# Patient Record
Sex: FEMALE | Race: WHITE | Employment: OTHER | URBAN - METROPOLITAN AREA
[De-identification: names, ages, dates, MRNs, and addresses within clinical notes are randomized per-mention and may not be internally consistent; named-entity substitution may affect disease eponyms.]

---

## 2017-01-25 ENCOUNTER — ANESTHESIA (OUTPATIENT)
Dept: ENDOSCOPY | Age: 72
End: 2017-01-25
Payer: MEDICARE

## 2017-01-25 ENCOUNTER — ANESTHESIA EVENT (OUTPATIENT)
Dept: ENDOSCOPY | Age: 72
End: 2017-01-25
Payer: MEDICARE

## 2017-01-25 PROCEDURE — 74011000250 HC RX REV CODE- 250

## 2017-01-25 PROCEDURE — 74011250636 HC RX REV CODE- 250/636

## 2017-01-25 RX ORDER — SODIUM CHLORIDE 9 MG/ML
INJECTION, SOLUTION INTRAVENOUS
Status: DISCONTINUED | OUTPATIENT
Start: 2017-01-25 | End: 2017-01-25 | Stop reason: HOSPADM

## 2017-01-25 RX ORDER — LIDOCAINE HYDROCHLORIDE 20 MG/ML
INJECTION, SOLUTION EPIDURAL; INFILTRATION; INTRACAUDAL; PERINEURAL AS NEEDED
Status: DISCONTINUED | OUTPATIENT
Start: 2017-01-25 | End: 2017-01-25 | Stop reason: HOSPADM

## 2017-01-25 RX ORDER — PROPOFOL 10 MG/ML
INJECTION, EMULSION INTRAVENOUS AS NEEDED
Status: DISCONTINUED | OUTPATIENT
Start: 2017-01-25 | End: 2017-01-25 | Stop reason: HOSPADM

## 2017-01-25 RX ADMIN — SODIUM CHLORIDE: 9 INJECTION, SOLUTION INTRAVENOUS at 13:10

## 2017-01-25 RX ADMIN — PROPOFOL 50 MG: 10 INJECTION, EMULSION INTRAVENOUS at 13:12

## 2017-01-25 RX ADMIN — PROPOFOL 50 MG: 10 INJECTION, EMULSION INTRAVENOUS at 13:16

## 2017-01-25 RX ADMIN — PROPOFOL 50 MG: 10 INJECTION, EMULSION INTRAVENOUS at 13:14

## 2017-01-25 RX ADMIN — LIDOCAINE HYDROCHLORIDE 40 MG: 20 INJECTION, SOLUTION EPIDURAL; INFILTRATION; INTRACAUDAL; PERINEURAL at 13:12

## 2017-01-25 RX ADMIN — PROPOFOL 50 MG: 10 INJECTION, EMULSION INTRAVENOUS at 13:21

## 2017-01-25 NOTE — ANESTHESIA PREPROCEDURE EVALUATION
Anesthetic History   No history of anesthetic complications            Review of Systems / Medical History  Patient summary reviewed, nursing notes reviewed and pertinent labs reviewed    Pulmonary  Within defined limits                 Neuro/Psych   Within defined limits           Cardiovascular  Within defined limits                     GI/Hepatic/Renal  Within defined limits              Endo/Other        Cancer     Other Findings            Physical Exam    Airway  Mallampati: II  TM Distance: > 6 cm  Neck ROM: normal range of motion   Mouth opening: Normal     Cardiovascular  Regular rate and rhythm,  S1 and S2 normal,  no murmur, click, rub, or gallop             Dental    Dentition: Lower dentition intact and Upper dentition intact     Pulmonary  Breath sounds clear to auscultation               Abdominal  GI exam deferred       Other Findings            Anesthetic Plan    ASA: 2  Anesthesia type: MAC            Anesthetic plan and risks discussed with: Patient

## 2017-01-25 NOTE — ANESTHESIA POSTPROCEDURE EVALUATION
Post-Anesthesia Evaluation and Assessment    Patient: Zuhair Gamino MRN: [id number]  SSN: [id number]    YOB: 1945  Age: 66 y.o. Sex: female       Cardiovascular Function/Vital Signs  Visit Vitals    /66    Pulse 75    Temp 36.4 °C (97.6 °F)    Resp 22    Ht 5' 3\" (1.6 m)    Wt 49.9 kg (110 lb)    SpO2 100%    BMI 19.49 kg/m2       Patient is status post MAC anesthesia for Procedure(s):  COLONOSCOPY. Nausea/Vomiting: None    Postoperative hydration reviewed and adequate. Pain:  Pain Scale 1: Numeric (0 - 10) (01/25/17 1401)  Pain Intensity 1: 0 (01/25/17 1401)   Managed    Neurological Status: At baseline    Mental Status and Level of Consciousness: Arousable    Pulmonary Status:   O2 Device: Room air (01/25/17 1401)   Adequate oxygenation and airway patent    Complications related to anesthesia: None    Post-anesthesia assessment completed.  No concerns    Signed By: David Torrez MD     January 25, 2017

## 2018-07-16 ENCOUNTER — OFFICE VISIT (OUTPATIENT)
Dept: FAMILY MEDICINE CLINIC | Age: 73
End: 2018-07-16

## 2018-07-16 VITALS
OXYGEN SATURATION: 97 % | HEIGHT: 63 IN | BODY MASS INDEX: 18.77 KG/M2 | HEART RATE: 86 BPM | RESPIRATION RATE: 16 BRPM | TEMPERATURE: 97.6 F | WEIGHT: 105.9 LBS | SYSTOLIC BLOOD PRESSURE: 118 MMHG | DIASTOLIC BLOOD PRESSURE: 77 MMHG

## 2018-07-16 DIAGNOSIS — M85.80 OSTEOPENIA, UNSPECIFIED LOCATION: ICD-10-CM

## 2018-07-16 DIAGNOSIS — M81.0 POSTMENOPAUSAL BONE LOSS: ICD-10-CM

## 2018-07-16 DIAGNOSIS — M54.2 NECK PAIN: ICD-10-CM

## 2018-07-16 DIAGNOSIS — Z13.820 OSTEOPOROSIS SCREENING: ICD-10-CM

## 2018-07-16 DIAGNOSIS — Z12.31 BREAST CANCER SCREENING BY MAMMOGRAM: ICD-10-CM

## 2018-07-16 DIAGNOSIS — E78.00 PURE HYPERCHOLESTEROLEMIA: ICD-10-CM

## 2018-07-16 DIAGNOSIS — R05.3 COUGH, PERSISTENT: Primary | ICD-10-CM

## 2018-07-16 RX ORDER — LANOLIN ALCOHOL/MO/W.PET/CERES
1000 CREAM (GRAM) TOPICAL DAILY
COMMUNITY

## 2018-07-16 RX ORDER — FLUTICASONE PROPIONATE 110 UG/1
1-2 AEROSOL, METERED RESPIRATORY (INHALATION) EVERY 12 HOURS
Qty: 1 INHALER | Refills: 0 | Status: SHIPPED | OUTPATIENT
Start: 2018-07-16 | End: 2020-02-20

## 2018-07-16 RX ORDER — CLINDAMYCIN PHOSPHATE 10 UG/ML
LOTION TOPICAL
Refills: 0 | COMMUNITY
Start: 2018-06-05

## 2018-07-16 RX ORDER — BENZONATATE 100 MG/1
CAPSULE ORAL AS NEEDED
COMMUNITY
Start: 2018-05-03 | End: 2018-07-16 | Stop reason: ALTCHOICE

## 2018-07-16 RX ORDER — PYRIDOXINE HCL (VITAMIN B6) 100 MG
100 TABLET ORAL DAILY
COMMUNITY

## 2018-07-16 NOTE — PROGRESS NOTES
Seeing derm for hair loss. Put on spironolactone for > yr. Did blood work 2 months ago. States cough began year ago May. Another virus this May. Cough has persisted. No color to mucus. Colonoscopy 1-2 yrs ago. Rec 3 yr repeat for h/o colon cancer. Had some neck pain which is resolving. Visit Vitals    /77    Pulse 86    Temp 97.6 °F (36.4 °C) (Oral)    Resp 16    Ht 5' 3\" (1.6 m)    Wt 105 lb 14.4 oz (48 kg)    SpO2 97%    BMI 18.76 kg/m2       Patient alert and cooperative. Reviewed above. Assessment:  1. Persistent cough post remote viral illness. Plan:  1. Rx for Flovent to try. 2. Follow up in two to three weeks if cough persists for possible pulmonary versus allergist referral.  3. Get records form derm, including labs. 4. Order fasting lipids. 5. Patient to return for annual wellness. 6. Follow otherwise here prn.

## 2018-07-16 NOTE — PROGRESS NOTES
Identified pt with two pt identifiers(name and ). Reviewed record in preparation for visit and have obtained necessary documentation. Chief Complaint   Patient presents with    Establish Care    Cough     pt c/o lingering cough for >1 yr; reports having a virus 2018, which cough has improved, but not yet resolved (was given prednisone- only took 1 dose due to causing insomnia), dx with bronchitis        Health Maintenance Due   Topic    ZOSTER VACCINE AGE 60>     GLAUCOMA SCREENING Q2Y     DTaP/Tdap/Td series (1 - Tdap)    BREAST CANCER SCRN MAMMOGRAM     MEDICARE YEARLY EXAM    -pt states not UTD with mammo, unsure if UTD with glaucoma (usually done with Dr. Romelia Mi), no shingles vaccine (did have chickenpox)    Coordination of Care Questionnaire:  :   1) Have you been to an emergency room, urgent care clinic since your last visit? yes - [facility] for cough on 5/3/18  Hospitalized since your last visit? no             2. Have seen or consulted any other health care provider since your last visit? YES  If yes, where when, and reason for visit? Dr. Riky Kasper (derm)    3) Do you have an Advanced Directive/ Living Will in place? YES  If yes, do we have a copy on file YES  If no, would you like information NO    Patient is accompanied by self I have received verbal consent from Milo Richter to discuss any/all medical information while they are present in the room.

## 2018-07-16 NOTE — MR AVS SNAPSHOT
[address] [place] [address] 
[address] [place] [address] 
[phone] Patient: Elie Chi MRN: [id number] GVX:1/8/7362 Visit Information Date & Time Provider Department Dept. Phone Encounter #  
 7/16/2018  3:00 PM Tonya Ramos MD [facility] [phone] [id number] Follow-up Instructions Return in about 4 weeks (around 8/13/2018) for [address] Routing History Upcoming Health Maintenance Date Due ZOSTER VACCINE AGE 60> 3/7/2004 GLAUCOMA SCREENING Q2Y 5/7/2009 DTaP/Tdap/Td series (1 - Tdap) 8/5/2011 MEDICARE YEARLY EXAM 3/14/2018 BREAST CANCER SCRN MAMMOGRAM 8/16/2018* Influenza Age 5 to Adult 8/1/2018 COLONOSCOPY 1/25/2022 *Topic was postponed. The date shown is not the original due date. Allergies as of 7/16/2018  Review Complete On: 7/16/2018 By: Ana Segura LPN No Known Allergies Current Immunizations  Reviewed on 4/8/2014 Name Date Pneumococcal Vaccine (Unspecified Type) 11/25/2013 TD Vaccine 8/4/2011 ZZZ-RETIRED (DO NOT USE) Pneumococcal Vaccine (Unspecified Type) 8/4/2011 Not reviewed this visit You Were Diagnosed With   
  
 Codes Comments Cough, persistent    -  Primary ICD-10-CM: R05 ICD-9-CM: 786.2 Neck pain     ICD-10-CM: M54.2 ICD-9-CM: 723.1 Breast cancer screening by mammogram     ICD-10-CM: Z12.31 
ICD-9-CM: V76.12 Postmenopausal bone loss     ICD-10-CM: M81.0 ICD-9-CM: 733.01 Osteoporosis screening     ICD-10-CM: Z13.820 ICD-9-CM: V82.81 Osteopenia, unspecified location     ICD-10-CM: M85.80 ICD-9-CM: 733.90 Pure hypercholesterolemia     ICD-10-CM: E78.00 ICD-9-CM: 272.0 Vitals BP Pulse Temp Resp Height(growth percentile) Weight(growth percentile) 118/77 86 97.6 °F (36.4 °C) (Oral) 16 5' 3\" (1.6 m) 105 lb 14.4 oz (48 kg) SpO2 BMI OB Status Smoking Status 97% 18.76 kg/m2 Postmenopausal Never Smoker Vitals History BMI and BSA Data Body Mass Index Body Surface Area 18.76 kg/m 2 1.46 m 2 Preferred Pharmacy Pharmacy Name Phone [id number] [facility] [phone] Your Updated Medication List  
  
   
This list is accurate as of 7/16/18  3:38 PM.  Always use your most recent med list.  
  
  
  
  
 clindamycin 1 % lotion Commonly known as:  CLEOCIN T  
APPLY TO AFFECTED AREA EVERY DAY  
  
 CO Q-10 100 mg capsule Generic drug:  co-enzyme Q-10 Take 100 mg by mouth daily. fluticasone 110 mcg/actuation inhaler Commonly known as:  FLOVENT HFA Take 1-2 Puffs by inhalation every twelve (12) hours. GAS-X PO Take  by mouth as needed. magnesium 250 mg Tab Take 500 mg by mouth as needed. METAMUCIL PO Take 1 Cap by mouth daily. * OTHER Takes turmeric po once daily * OTHER Take 1 Tab by mouth daily. Takes one stool softener po twice daily. Indications: constipation, Dulcolax  
  
 spironolactone 50 mg tablet Commonly known as:  ALDACTONE Take 50 mg by mouth two (2) times a day. VITAMIN B-12 1,000 mcg tablet Generic drug:  cyanocobalamin Take 1,000 mcg by mouth daily. VITAMIN B-6 100 mg tablet Generic drug:  pyridoxine (vitamin B6) Take 100 mg by mouth daily. * Notice: This list has 2 medication(s) that are the same as other medications prescribed for you. Read the directions carefully, and ask your doctor or other care provider to review them with you. Prescriptions Sent to Pharmacy Refills  
 fluticasone (FLOVENT HFA) 110 mcg/actuation inhaler 0 Sig: Take 1-2 Puffs by inhalation every twelve (12) hours. Class: Normal  
 Pharmacy: [address] Ph #: [phone] Route: Inhalation We Performed the Following LIPID PANEL [08985 CPT(R)] Follow-up Instructions Return in about 4 weeks (around 8/13/2018) for [address] To-Do List   
 07/16/2018 Imaging:  DEXA BONE DENSITY STUDY AXIAL   
  
 07/16/2018 Imaging:  [facility] MAMMO BI SCREENING INCL CAD Introducing [facility]! Dear Sergey Banda: Thank you for requesting a [employer] account. Our records indicate that you already have an active [employer] account. You can access your account anytime at [email or web address] Did you know that you can access your hospital and ER discharge instructions at any time in [employer]? You can also review all of your test results from your hospital stay or ER visit. Additional Information If you have questions, please visit the Frequently Asked Questions section of the [employer] website at [email or web address] Remember, [employer] is NOT to be used for urgent needs. For medical emergencies, dial 911. Now available from your iPhone and Android! Please provide this summary of care documentation to your next provider. Your primary care clinician is listed as [phone] If you have any questions after today's visit, please call [phone].

## 2018-08-13 ENCOUNTER — HOSPITAL ENCOUNTER (OUTPATIENT)
Dept: MAMMOGRAPHY | Age: 73
Discharge: HOME | End: 2018-08-13
Attending: FAMILY MEDICINE
Payer: MEDICARE

## 2018-08-13 DIAGNOSIS — Z13.820 OSTEOPOROSIS SCREENING: ICD-10-CM

## 2018-08-13 DIAGNOSIS — M85.80 OSTEOPENIA, UNSPECIFIED LOCATION: ICD-10-CM

## 2018-08-13 DIAGNOSIS — M81.0 POSTMENOPAUSAL BONE LOSS: ICD-10-CM

## 2018-08-13 PROCEDURE — 77080 DXA BONE DENSITY AXIAL: CPT

## 2018-08-14 ENCOUNTER — TELEPHONE (OUTPATIENT)
Dept: FAMILY MEDICINE CLINIC | Age: 73
End: 2018-08-14

## 2018-08-14 NOTE — PROGRESS NOTES
Verified two paint identifiers, name and . Patient provided with lab results. Patient stated she will call back for Dr Kandace Kirby if she wants to start the medication.

## 2018-08-14 NOTE — TELEPHONE ENCOUNTER
----- Message from Guerrero Carpenter MD sent at 8/13/2018  3:09 PM EDT -----  Shows osteoporosis. Tx recommended. Advise if wants to start meds.

## 2020-02-20 ENCOUNTER — OFFICE VISIT (OUTPATIENT)
Dept: FAMILY MEDICINE CLINIC | Age: 75
End: 2020-02-20

## 2020-02-20 VITALS
OXYGEN SATURATION: 98 % | SYSTOLIC BLOOD PRESSURE: 118 MMHG | HEART RATE: 79 BPM | RESPIRATION RATE: 14 BRPM | WEIGHT: 107 LBS | TEMPERATURE: 97.2 F | BODY MASS INDEX: 18.96 KG/M2 | DIASTOLIC BLOOD PRESSURE: 70 MMHG | HEIGHT: 63 IN

## 2020-02-20 DIAGNOSIS — M81.0 AGE-RELATED OSTEOPOROSIS WITHOUT CURRENT PATHOLOGICAL FRACTURE: ICD-10-CM

## 2020-02-20 DIAGNOSIS — Z00.00 MEDICARE ANNUAL WELLNESS VISIT, INITIAL: ICD-10-CM

## 2020-02-20 DIAGNOSIS — Z71.89 ADVANCED DIRECTIVES, COUNSELING/DISCUSSION: ICD-10-CM

## 2020-02-20 DIAGNOSIS — E78.5 HYPERLIPIDEMIA, UNSPECIFIED HYPERLIPIDEMIA TYPE: Primary | ICD-10-CM

## 2020-02-20 DIAGNOSIS — Z85.038 HISTORY OF COLON CANCER, STAGE III: ICD-10-CM

## 2020-02-20 NOTE — PROGRESS NOTES
Chief Complaint   Patient presents with   [address] [place] [address] Annual Wellness Visit     1. Have you been to the ER, urgent care clinic since your last visit? Hospitalized since your last visit? No    2. Have you seen or consulted any other health care providers outside of the [address] [place] [address] since your last visit? Include any pap smears or colon screening.   No  Dr. Sadi Hartmann Dermatologist    Health Maintenance Due   Topic Date Due    DTaP/Tdap/Td series (1 - Tdap) 05/07/1955    Shingrix Vaccine Age 50> (1 of 2) 05/07/1994    GLAUCOMA SCREENING Q2Y  05/07/2009    Medicare Yearly Exam  03/14/2018    Lipid Screen  04/08/2019

## 2020-02-20 NOTE — ACP (ADVANCE CARE PLANNING)
Advance Care Planning    Advance Care Planning (ACP) Provider Conversation Snapshot    Date of ACP Conversation: 02/20/20  Persons included in Conversation:  patient  Length of ACP Conversation in minutes:  <16 minutes (Non-Billable)    Authorized Decision Maker (if patient is incapable of making informed decisions): This person is:    Other Legally Authorized Decision Maker (e.g. Next of Kin)  Odin          For Patients with Decision Making Capacity:   Values/Goals: Exploration of values, goals, and preferences if recovery is not expected, even with continued medical treatment in the event of:  Imminent death  Severe, permanent brain injury    Conversation Outcomes / Follow-Up Plan:   Reviewed existing Advance Directive

## 2020-02-20 NOTE — PATIENT INSTRUCTIONS
Medicare Wellness Visit, Female The best way to live healthy is to have a lifestyle where you eat a well-balanced diet, exercise regularly, limit alcohol use, and quit all forms of tobacco/nicotine, if applicable. Regular preventive services are another way to keep healthy. Preventive services (vaccines, screening tests, monitoring & exams) can help personalize your care plan, which helps you manage your own care. Screening tests can find health problems at the earliest stages, when they are easiest to treat. [facility] follows the current, evidence-based guidelines published by the [facility] Lowry ([facility]) when recommending preventive services for our patients. Because we follow these guidelines, sometimes recommendations change over time as research supports it. (For example, mammograms used to be recommended annually. Even though Medicare will still pay for an annual mammogram, the newer guidelines recommend a mammogram every two years for women of average risk). Of course, you and your doctor may decide to screen more often for some diseases, based on your risk and your co-morbidities (chronic disease you are already diagnosed with). Preventive services for you include: - Medicare offers their members a free annual wellness visit, which is time for you and your primary care provider to discuss and plan for your preventive service needs. Take advantage of this benefit every year! 
-All adults over the age of 72 should receive the recommended pneumonia vaccines. Current USPSTF guidelines recommend a series of two vaccines for the best pneumonia protection.  
-All adults should have a flu vaccine yearly and a tetanus vaccine every 10 years.  
-All adults age 48 and older should receive the shingles vaccines (series of two vaccines). -All adults age 38-68 who are overweight should have a diabetes screening test once every three years. -All adults born between [birth year] and 1965 should be screened once for Hepatitis C. 
-Other screening tests and preventive services for persons with diabetes include: an eye exam to screen for diabetic retinopathy, a kidney function test, a foot exam, and stricter control over your cholesterol.  
-Cardiovascular screening for adults with routine risk involves an electrocardiogram (ECG) at intervals determined by your doctor.  
-Colorectal cancer screenings should be done for adults age 54-65 with no increased risk factors for colorectal cancer. There are a number of acceptable methods of screening for this type of cancer. Each test has its own benefits and drawbacks. Discuss with your doctor what is most appropriate for you during your annual wellness visit. The different tests include: colonoscopy (considered the best screening method), a fecal occult blood test, a fecal DNA test, and sigmoidoscopy. 
 
-A bone mass density test is recommended when a woman turns 65 to screen for osteoporosis. This test is only recommended one time, as a screening. Some providers will use this same test as a disease monitoring tool if you already have osteoporosis. -Breast cancer screenings are recommended every other year for women of normal risk, age 54-69. 
-Cervical cancer screenings for women over age 72 are only recommended with certain risk factors. Here is a list of your current Health Maintenance items (your personalized list of preventive services) with a due date: 
Health Maintenance Due Topic Date Due  
 DTaP/Tdap/Td  (1 - Tdap) 05/07/1955  Glaucoma Screening   05/07/2009 [address] [place] [address] Annual Well Visit  03/14/2018  Cholesterol Test   04/08/2019

## 2020-02-20 NOTE — PROGRESS NOTES
This is an Initial Medicare Annual Wellness Exam (AWV) (Performed 12 months after IPPE or effective date of Medicare Part B enrollment, Once in a lifetime)    I have reviewed the patient's medical history in detail and updated the computerized patient record. Eye doctor past yr. Needs cataract surgery. Dentist 2 x annually. Colonoscopy '14.  5 yr repeat for h/o colon cancer. Mammo 5-10 yrs. Derm 2 x annually. No signif hx past yr. Had DEXA 2-3 yrs. History     Patient Active Problem List   Diagnosis Code    Pure hypercholesterolemia E78.00    Insomnia G47.00    History of colon cancer, stage III Z85.038    Allergic rhinitis J30.9    Age-related osteoporosis without current pathological fracture M81.0    Scalp cyst L72.9     Past Medical History:   Diagnosis Date    Acute pharyngitis 05/11/2017    [facility] PF note    Cancer ([facility] [address] 2001    colon dr Michele Cullen Hypercholesterolemia     Ill-defined condition     frozen shoulder - PT    Insect bite 09/09/2016    PF Tania doxy 100x 10 day  132/82    Insomnia     Davis's neuroma of left foot     7[facility]13/15 [facility] Pod notes    Osteopenia     Trochanteric bursitis     10/06/14 note from Rheumatology Dr Maria R Fortune      Past Surgical History:   Procedure Laterality Date    COLONOSCOPY N/A 1/25/2017    COLONOSCOPY performed by Tank Thomas [facility] at [facility] 149 W/O CONT WITH CALCIUM  10/2011    perfect zero score    ENDOSCOPY, COLON, DIAGNOSTIC  2001    colon cancer Dr Matt Sims HX COLONOSCOPY  4/8/09    3 yr repeat per report  Alyssa Evangelista    HX TUBAL LIGATION      HX VASCULAR ACCESS      port in and out     Current Outpatient Medications   Medication Sig Dispense Refill    pyridoxine, vitamin B6, (VITAMIN B-6) 100 mg tablet Take 100 mg by mouth daily.  cyanocobalamin (VITAMIN B-12) 1,000 mcg tablet Take 1,000 mcg by mouth daily.       clindamycin (CLEOCIN T) 1 % lotion APPLY TO AFFECTED AREA EVERY DAY  0    spironolactone (ALDACTONE) 50 mg tablet Take 50 mg by mouth two (2) times a day.  OTHER Takes turmeric po once daily      co-enzyme Q-10 (CO Q-10) 100 mg capsule Take 100 mg by mouth daily.  OTHER Take 1 Tab by mouth daily. Takes one stool softener po twice daily. Indications: constipation, Dulcolax      SIMETHICONE (GAS-X PO) Take  by mouth as needed.  magnesium 250 mg tab Take 500 mg by mouth as needed.  fluticasone (FLOVENT HFA) 110 mcg/actuation inhaler Take 1-2 Puffs by inhalation every twelve (12) hours. 1 Inhaler 0    PSYLLIUM SEED, WITH SUGAR, (METAMUCIL PO) Take 1 Cap by mouth daily. No Known Allergies    Family History   Problem Relation Age of Onset   Barrios Arthritis-rheumatoid Mother     COPD Mother     Hypertension Father     Stroke Father     Cancer Maternal Grandmother         colon    Arthritis-rheumatoid Maternal Grandmother     Stroke Paternal Grandfather     Other Son         colon polyps    Cancer Other         cousin with colon cancer     Social History     Tobacco Use    Smoking status: Never Smoker    Smokeless tobacco: Never Used   Substance Use Topics    Alcohol use: No       Depression Risk Factor Screening:     3 most recent PHQ Screens 2/20/2020   Little interest or pleasure in doing things -   Feeling down, depressed, irritable, or hopeless Not at all   Total Score PHQ 2 -       Alcohol Risk Factor Screening:   Do you average 1 drink per night or more than 7 drinks a week:  No    On any one occasion in the past three months have you have had more than 3 drinks containing alcohol:  No      Functional Ability and Level of Safety:   Hearing: The patient needs further evaluation. Activities of Daily Living: The home contains: handrails and rugs  Patient does total self care     Ambulation: with no difficulty    Fall Risk:  Fall Risk Assessment, last 12 mths 2/20/2020   Able to walk? Yes   Fall in past 12 months?  No Abuse Screen:  Patient is not abused    Cognitive Screening   Has your family/caregiver stated any concerns about your memory: no  Cognitive Screening: ND    Patient Care Team   Patient Care Team:  Aelx Forbes MD as PCP - Fabricio Mccrary MD as PCP - [facility] Empaneled Provider  Hudson Saldana MD (Obstetrics & Gynecology)  Suleman Styles MD (Gastroenterology)    Assessment/Plan   Education and counseling provided:  Are appropriate based on today's review and evaluation  End-of-Life planning (with patient's consent)  Pneumococcal Vaccine  Influenza Vaccine  Screening Mammography  Colorectal cancer screening tests  Cardiovascular screening blood test  Bone mass measurement (DEXA)  Screening for glaucoma  Diabetes screening test    Diagnoses and all orders for this visit:    1. Medicare annual wellness visit, initial    2. Hyperlipidemia, unspecified hyperlipidemia type  -     CT HEART W/O CONT WITH CALCIUM; Future    3. History of colon cancer, stage III    4.  Age-related osteoporosis without current pathological fracture         Health Maintenance Due   Topic Date Due    DTaP/Tdap/Td series (1 - Tdap) 05/07/1955    GLAUCOMA SCREENING Q2Y  05/07/2009    Medicare Yearly Exam  03/14/2018    Lipid Screen  04/08/2019

## 2020-03-12 ENCOUNTER — HOSPITAL ENCOUNTER (OUTPATIENT)
Dept: CT IMAGING | Age: 75
Discharge: HOME | End: 2020-03-12
Attending: FAMILY MEDICINE
Payer: SELF-PAY

## 2020-03-12 DIAGNOSIS — E78.5 HYPERLIPIDEMIA, UNSPECIFIED HYPERLIPIDEMIA TYPE: ICD-10-CM

## 2020-03-12 PROCEDURE — 75571 CT HRT W/O DYE W/CA TEST: CPT

## 2020-03-27 NOTE — PROGRESS NOTES
Pt called, ID verified. Pt advised CT Heart w/o cont with calcium showed minimal plaque in right coronary. Rec meds if LDL > 160. Pt verbalized understanding.

## 2021-01-28 ENCOUNTER — TRANSCRIBE ORDER (OUTPATIENT)
Dept: SCHEDULING | Age: 76
End: 2021-01-28

## 2021-01-28 DIAGNOSIS — Z85.038 HX OF MALIGNANT NEOPLASM OF COLON: ICD-10-CM

## 2021-01-28 DIAGNOSIS — M79.622 LEFT AXILLARY PAIN: ICD-10-CM

## 2021-01-28 DIAGNOSIS — N64.4 BREAST PAIN, LEFT: Primary | ICD-10-CM

## 2021-02-16 ENCOUNTER — HOSPITAL ENCOUNTER (OUTPATIENT)
Dept: ULTRASOUND IMAGING | Age: 76
Discharge: HOME | End: 2021-02-16
Attending: NURSE PRACTITIONER
Payer: MEDICARE

## 2021-02-16 ENCOUNTER — HOSPITAL ENCOUNTER (OUTPATIENT)
Dept: MAMMOGRAPHY | Age: 76
Discharge: HOME | End: 2021-02-16
Attending: NURSE PRACTITIONER
Payer: MEDICARE

## 2021-02-16 DIAGNOSIS — M79.622 LEFT AXILLARY PAIN: ICD-10-CM

## 2021-02-16 DIAGNOSIS — Z85.038 HX OF MALIGNANT NEOPLASM OF COLON: ICD-10-CM

## 2021-02-16 DIAGNOSIS — N64.4 BREAST PAIN, LEFT: ICD-10-CM

## 2021-02-16 PROCEDURE — 77067 SCR MAMMO BI INCL CAD: CPT

## 2021-03-29 ENCOUNTER — TRANSCRIBE ORDER (OUTPATIENT)
Dept: SCHEDULING | Age: 76
End: 2021-03-29

## 2021-03-29 DIAGNOSIS — R22.30 MASS IN ARMPIT: Primary | ICD-10-CM

## 2021-04-15 ENCOUNTER — HOSPITAL ENCOUNTER (OUTPATIENT)
Dept: ULTRASOUND IMAGING | Age: 76
Discharge: HOME | End: 2021-04-15
Attending: FAMILY MEDICINE
Payer: MEDICARE

## 2021-04-15 DIAGNOSIS — R22.30 MASS IN ARMPIT: ICD-10-CM

## 2021-04-15 PROCEDURE — 76882 US LMTD JT/FCL EVL NVASC XTR: CPT

## 2023-05-10 RX ORDER — SPIRONOLACTONE 50 MG/1
TABLET, FILM COATED ORAL 2 TIMES DAILY
COMMUNITY

## 2023-05-10 RX ORDER — UBIDECARENONE 100 MG
100 CAPSULE ORAL DAILY
COMMUNITY

## 2023-05-10 RX ORDER — CLINDAMYCIN PHOSPHATE 10 UG/ML
LOTION TOPICAL
COMMUNITY
Start: 2018-06-05

## 2023-05-10 RX ORDER — PYRIDOXINE HCL (VITAMIN B6) 100 MG
100 TABLET ORAL DAILY
COMMUNITY